# Patient Record
Sex: MALE | Race: BLACK OR AFRICAN AMERICAN | Employment: UNEMPLOYED | URBAN - METROPOLITAN AREA
[De-identification: names, ages, dates, MRNs, and addresses within clinical notes are randomized per-mention and may not be internally consistent; named-entity substitution may affect disease eponyms.]

---

## 2018-03-21 ENCOUNTER — OFFICE VISIT (OUTPATIENT)
Dept: CARDIOLOGY CLINIC | Age: 33
End: 2018-03-21

## 2018-03-21 VITALS
SYSTOLIC BLOOD PRESSURE: 200 MMHG | DIASTOLIC BLOOD PRESSURE: 120 MMHG | HEART RATE: 72 BPM | BODY MASS INDEX: 39.47 KG/M2 | HEIGHT: 73 IN | WEIGHT: 297.8 LBS

## 2018-03-21 DIAGNOSIS — R06.02 SOB (SHORTNESS OF BREATH): ICD-10-CM

## 2018-03-21 DIAGNOSIS — R94.31 ABNORMAL EKG: ICD-10-CM

## 2018-03-21 DIAGNOSIS — I10 MALIGNANT HYPERTENSION: ICD-10-CM

## 2018-03-21 PROCEDURE — 99204 OFFICE O/P NEW MOD 45 MIN: CPT | Performed by: INTERNAL MEDICINE

## 2018-03-21 RX ORDER — HYDROCHLOROTHIAZIDE 25 MG/1
25 TABLET ORAL DAILY
Qty: 30 TABLET | Refills: 5 | Status: SHIPPED | OUTPATIENT
Start: 2018-03-21

## 2018-03-21 RX ORDER — LISINOPRIL 40 MG/1
40 TABLET ORAL DAILY
Qty: 30 TABLET | Refills: 5 | Status: ON HOLD
Start: 2018-03-21 | End: 2018-06-01 | Stop reason: HOSPADM

## 2018-03-21 NOTE — LETTER
[address]  [address] [place] [address] [address] [address] [address]  Phone: [phone]  Fax: [phone]    Sandra Patel MD      March 21, 2018     Patient: Nj Pugh   YOB: 1985   Date of Visit: 3/21/2018     To Whom It May Concern: It is my medical opinion that Nj Pugh should stay off work until cardiac testing and evaluation has been completed. If you have any questions or concerns, please don't hesitate to call.     Sincerely,        Sandra Patel MD

## 2018-03-21 NOTE — PROGRESS NOTES
Normal breath sounds without dullness  Cardiovascular:  · The apical impulses not displaced  · Heart tones are crisp and normal  · Cervical veins are not engorged  · The carotid upstroke is normal in amplitude and contour without delay or bruit  · Peripheral pulses are symmetrical and full  · There is no clubbing, cyanosis of the extremities. · No edema  · Femoral Arteries: 2+ and equal  · Pedal Pulses: 2+ and equal   Abdomen:  · No masses or tenderness  · Liver/Spleen: No Abnormalities Noted  Neurological/Psychiatric:  · Alert and oriented in all spheres  · Moves all extremities well  · Exhibits normal gait balance and coordination  · No abnormalities of mood, affect, memory, mentation, or behavior are noted    Assessment/Plan:      Abnormal EKG from 3/18/18  EKG 3/18/18> Normal rhythm with hypertensive effects. Hypertension, malignant  Blood pressure (!) 200/120, pulse 72, height 6' 1\" (1.854 m), weight 297 lb 12.8 oz (135.1 kg). He took his Lisinopril 40mg this morning. He is also on prednisone. Add HCTZ 25mg daily in the morning along with the ACE. Creatinine 1.5 on 3/18/18 while in the ER. SOB  With inclines, some palps. Mr. Romeo Calles agrees to schedule a stress echo next week. I recommend he stay off work until testing completed. Letter given to patient. Add HCTZ 25mg to aid blood pressure. Return to office based on test results. I appreciate the opportunity of cooperating in the care of this individual.    Shemar Mcfadden.  Peter Bowling M.D., [facility]

## 2018-03-26 NOTE — COMMUNICATION BODY
tablet Take 2 tablets by mouth every 6 hours as needed for Pain 9/29/17   Arianna Soriano PA-C   lisinopril (PRINIVIL) 10 MG tablet Take 1 tablet by mouth daily  Patient taking differently: Take 40 mg by mouth daily  9/29/17 3/18/18  Arianna Soriano PA-C   albuterol (PROVENTIL HFA;VENTOLIN HFA) 108 (90 BASE) MCG/ACT inhaler Inhale 2 puffs into the lungs every 6 hours as needed. Historical Provider, MD      Allergies:  Ibuprofen     Review of Systems:   · Constitutional: there has been no unanticipated weight loss. There's been no change in energy level, sleep pattern, or activity level. · Eyes: No visual changes or diplopia. No scleral icterus. · ENT: No Headaches, hearing loss or vertigo. No mouth sores or sore throat. · Cardiovascular: Reviewed in HPI  · Respiratory: No cough or wheezing, no sputum production. No hematemesis. · Gastrointestinal: No abdominal pain, appetite loss, blood in stools. No change in bowel or bladder habits. · Genitourinary: No dysuria, trouble voiding, or hematuria. · Musculoskeletal:  No gait disturbance, weakness or joint complaints. · Integumentary: No rash or pruritis. · Neurological: No headache, diplopia, change in muscle strength, numbness or tingling. No change in gait, balance, coordination, mood, affect, memory, mentation, behavior. · Psychiatric: No anxiety, no depression. · Endocrine: No malaise, fatigue or temperature intolerance. No excessive thirst, fluid intake, or urination. No tremor. · Hematologic/Lymphatic: No abnormal bruising or bleeding, blood clots or swollen lymph nodes. · Allergic/Immunologic: No nasal congestion or hives.     Physical Examination:    Vitals:    03/21/18 1413   BP: (!) 200/120   Pulse:      Constitutional and General Appearance: Appears stated age, NAD  Skin:good turgor,intact without lesions  HEENT: EOMI ,normal  Neck:no JVD   Respiratory:  · Normal excursion and expansion without use of accessory muscles  · Resp Auscultation:

## 2018-03-27 ENCOUNTER — HOSPITAL ENCOUNTER (OUTPATIENT)
Dept: NON INVASIVE DIAGNOSTICS | Age: 33
Discharge: HOME | End: 2018-03-27
Attending: INTERNAL MEDICINE | Admitting: INTERNAL MEDICINE

## 2018-03-27 DIAGNOSIS — I10 ESSENTIAL (PRIMARY) HYPERTENSION: ICD-10-CM

## 2018-03-27 LAB
LEFT VENTRICULAR EJECTION FRACTION HIGH VALUE: 70 %
LEFT VENTRICULAR EJECTION FRACTION MODE: NORMAL

## 2018-05-29 PROBLEM — I10 ESSENTIAL HYPERTENSION: Status: ACTIVE | Noted: 2018-05-29

## 2018-05-29 PROBLEM — J96.01 ACUTE RESPIRATORY FAILURE WITH HYPOXIA (HCC): Status: ACTIVE | Noted: 2018-05-29

## 2018-05-29 PROBLEM — J45.901 ASTHMA EXACERBATION ATTACKS: Status: ACTIVE | Noted: 2018-05-29

## 2018-09-24 NOTE — PROGRESS NOTES
[facility]   Cardiac Follow up    Referring Provider:  Kristofer Viera     Chief Complaint   Patient presents with    6 Month Follow-Up     h/o \"abn. EKG\"    Hypertension    Shortness of Breath      History of Present Illness:  Ms. Romeo Calles is here today for regular follow up. I initially saw him March 2018 as a cardiac consultation request by Dr. Lv Denson for evaluation of an \"abnormal EKG\" which was done 3/18/18. He was rushed to the ER 3/18/18 after an apparent allergic reaction to ibuprofen; he received epi, steroids, nebulizers; he was sent home with oral prednisone. Initially referred for abnormal EKG that day. Today, he states he feels well overall. He denies exertional chest pain, NEWMAN/PND, palpitations, light-headedness, edema. He exercises at the gym, lifts weight, no problems with this activity. He does not smoke. Past Medical History:   has a past medical history of Asthma and HTN (hypertension). Surgical History:   has a past surgical history that includes Biceps tendon repair (Left). Social History:   reports that he has never smoked. He has never used smokeless tobacco. He reports that he drinks alcohol. He reports that he does not use drugs. Family History:  family history includes High Blood Pressure in his father. Home Medications:  Prior to Admission medications    Medication Sig Start Date End Date Taking? Authorizing Provider   loratadine-pseudoephedrine (CLARITIN-D 12 HOUR) 5-120 MG per extended release tablet Take 1 tablet by mouth 2 times daily 9/12/18   Michael Swanson PA-C   albuterol sulfate HFA (PROVENTIL HFA) 108 (90 Base) MCG/ACT inhaler Inhale 2 puffs into the lungs every 4 hours as needed for Wheezing or Shortness of Breath (Space out to every 6 hours as symptoms improve) Space out to every 6 hours as symptoms improve.  9/12/18   Michaellatasha Swanson PA-C   spironolactone (ALDACTONE) 25 MG tablet Take 1 tablet by mouth daily

## 2018-09-26 ENCOUNTER — OFFICE VISIT (OUTPATIENT)
Dept: CARDIOLOGY CLINIC | Age: 33
End: 2018-09-26
Payer: COMMERCIAL

## 2018-09-26 VITALS
WEIGHT: 292 LBS | DIASTOLIC BLOOD PRESSURE: 90 MMHG | SYSTOLIC BLOOD PRESSURE: 158 MMHG | HEIGHT: 73 IN | BODY MASS INDEX: 38.7 KG/M2 | HEART RATE: 76 BPM

## 2018-09-26 DIAGNOSIS — I10 ESSENTIAL HYPERTENSION: Primary | ICD-10-CM

## 2018-09-26 DIAGNOSIS — R06.02 SOB (SHORTNESS OF BREATH): ICD-10-CM

## 2018-09-26 DIAGNOSIS — R94.31 ABNORMAL EKG: ICD-10-CM

## 2018-09-26 DIAGNOSIS — R73.09 ELEVATED GLUCOSE: ICD-10-CM

## 2018-09-26 PROCEDURE — 99214 OFFICE O/P EST MOD 30 MIN: CPT | Performed by: INTERNAL MEDICINE

## 2018-09-26 RX ORDER — LISINOPRIL 20 MG/1
20 TABLET ORAL 2 TIMES DAILY
Qty: 60 TABLET | Refills: 11 | Status: SHIPPED | OUTPATIENT
Start: 2018-09-26

## 2018-09-26 RX ORDER — AMLODIPINE BESYLATE 5 MG/1
5 TABLET ORAL DAILY
Qty: 30 TABLET | Refills: 11 | Status: SHIPPED | OUTPATIENT
Start: 2018-09-26 | End: 2018-11-25

## 2018-09-26 RX ORDER — SPIRONOLACTONE 25 MG/1
25 TABLET ORAL DAILY
Qty: 30 TABLET | Refills: 11 | Status: CANCELLED | OUTPATIENT
Start: 2018-09-26

## 2018-09-26 NOTE — LETTER
[facility]  [address]  [address] [address] [address]  Phone: [phone]  Fax: [phone]    Kaiden Bishop MD        September 28, 2018     [facility] [address]    Patient: Michelle Walters  MR Number: [id number]  YOB: 1985  Date of Visit: 9/26/2018    Dear Dr. Dorothy Dancer:    Below are the relevant portions of my assessment and plan of care. [facility]   Cardiac Follow up    Referring Provider:  Dorothy Dancer     Chief Complaint   Patient presents with    6 Month Follow-Up     h/o \"abn. EKG\"    Hypertension    Shortness of Breath      History of Present Illness:  Ms. Mejia Westfall is here today for regular follow up. I initially saw him March 2018 as a cardiac consultation request by Dr. Garth Kamara for evaluation of an \"abnormal EKG\" which was done 3/18/18. He was rushed to the ER 3/18/18 after an apparent allergic reaction to ibuprofen; he received epi, steroids, nebulizers; he was sent home with oral prednisone. Initially referred for abnormal EKG that day. Today, he states he feels well overall. He denies exertional chest pain, NEWMAN/PND, palpitations, light-headedness, edema. He exercises at the gym, lifts weight, no problems with this activity. He does not smoke. Past Medical History:   has a past medical history of Asthma and HTN (hypertension). Surgical History:   has a past surgical history that includes Biceps tendon repair (Left). Social History:   reports that he has never smoked. He has never used smokeless tobacco. He reports that he drinks alcohol. He reports that he does not use drugs. Family History:  family history includes High Blood Pressure in his father. Home Medications:  Prior to Admission medications    Medication Sig Start Date End Date Taking?  Authorizing Provider   loratadine-pseudoephedrine (CLARITIN-D 12 HOUR) 5-120 MG per extended

## 2018-09-28 NOTE — COMMUNICATION BODY
depression. · Endocrine: No malaise, fatigue or temperature intolerance. No excessive thirst, fluid intake, or urination. No tremor. · Hematologic/Lymphatic: No abnormal bruising or bleeding, blood clots or swollen lymph nodes. · Allergic/Immunologic: No nasal congestion or hives. Physical Examination:    Blood pressure (!) 158/90, pulse 76, height 6' 1\" (1.854 m), weight 292 lb (132.5 kg). Constitutional and General Appearance: Appears stated age, NAD  Skin:good turgor,intact without lesions  HEENT: EOMI ,normal  Neck:no JVD   Respiratory:  · Normal excursion and expansion without use of accessory muscles  · Resp Auscultation: Normal breath sounds without dullness  Cardiovascular:  · The apical impulses not displaced  · Heart tones are crisp and normal  · Cervical veins are not engorged  · The carotid upstroke is normal in amplitude and contour without delay or bruit  · Peripheral pulses are symmetrical and full  · There is no clubbing, cyanosis of the extremities. · No edema  · Femoral Arteries: 2+ and equal  · Pedal Pulses: 2+ and equal   Abdomen:  · No masses or tenderness  · Liver/Spleen: No Abnormalities Noted  Neurological/Psychiatric:  · Alert and oriented in all spheres  · Moves all extremities well  · Exhibits normal gait balance and coordination  · No abnormalities of mood, affect, memory, mentation, or behavior are noted    Assessment:    Abnormal EKG from 3/18/18  EKG 3/18/18> Normal rhythm with hypertensive effects. Hypertension, malignant (improved)  Elevated today. 170/86 on recheck by me. Blood pressure (!) 158/90, pulse 76, height 6' 1\" (1.854 m), weight 292 lb (132.5 kg). Creatinine 1.3 / K+ 3.7 on 6/1/18 (creat. Improved). Glucose 272 5/31/18, then 119 on 6/1/18. Stop spironolactone. Begin amlodipine 5mg qd. Remains on Lisinopril 20mg bid & HCTZ 25mg qd. ECHO 6/1/18> Normal with EF 55%, trivial TR. SOB, chronic  With inclines, some palps.         Plan:  Mr. Ghotra has a stable cardiac status. 1. Stop Aldactone. Begin amlodipine 5mg. BMP / A1c 2 weeks. 2. Will continue with risk factor modifications. 3. Return for regular follow up in 4 months. 4. Has deepika't with PCP coming up soon. I appreciate the opportunity of cooperating in the care of this individual.    Maite Goyal. Jd Zelaya M.D., [facility]    This note was scribed in the presence of Dr. Jd Zelaya MD, by Sukh Patel RN. The scribe's documentation has been prepared under my direction and personally reviewed by me in its entirety. I confirm that the note above accurately reflects all work, treatment, procedures, and medical decision making performed by me.

## 2018-11-25 ENCOUNTER — APPOINTMENT (OUTPATIENT)
Dept: CT IMAGING | Age: 33
End: 2018-11-25
Payer: COMMERCIAL

## 2018-11-25 ENCOUNTER — HOSPITAL ENCOUNTER (EMERGENCY)
Age: 33
Discharge: HOME | End: 2018-11-25
Payer: COMMERCIAL

## 2018-11-25 VITALS
SYSTOLIC BLOOD PRESSURE: 180 MMHG | RESPIRATION RATE: 18 BRPM | TEMPERATURE: 98.1 F | DIASTOLIC BLOOD PRESSURE: 89 MMHG | HEIGHT: 73 IN | BODY MASS INDEX: 38.7 KG/M2 | OXYGEN SATURATION: 100 % | HEART RATE: 89 BPM | WEIGHT: 292 LBS

## 2018-11-25 DIAGNOSIS — S01.81XA FACIAL LACERATION, INITIAL ENCOUNTER: Primary | ICD-10-CM

## 2018-11-25 PROCEDURE — 6360000002 HC RX W HCPCS: Performed by: PHYSICIAN ASSISTANT

## 2018-11-25 PROCEDURE — 90471 IMMUNIZATION ADMIN: CPT | Performed by: PHYSICIAN ASSISTANT

## 2018-11-25 PROCEDURE — 96372 THER/PROPH/DIAG INJ SC/IM: CPT

## 2018-11-25 PROCEDURE — 4500000023 HC ED LEVEL 3 PROCEDURE

## 2018-11-25 PROCEDURE — 70486 CT MAXILLOFACIAL W/O DYE: CPT

## 2018-11-25 PROCEDURE — 90715 TDAP VACCINE 7 YRS/> IM: CPT | Performed by: PHYSICIAN ASSISTANT

## 2018-11-25 PROCEDURE — 99283 EMERGENCY DEPT VISIT LOW MDM: CPT

## 2018-11-25 PROCEDURE — 6370000000 HC RX 637 (ALT 250 FOR IP): Performed by: PHYSICIAN ASSISTANT

## 2018-11-25 RX ORDER — LISINOPRIL 10 MG/1
20 TABLET ORAL ONCE
Status: COMPLETED | OUTPATIENT
Start: 2018-11-25 | End: 2018-11-25

## 2018-11-25 RX ORDER — HYDROCHLOROTHIAZIDE 25 MG/1
25 TABLET ORAL ONCE
Status: COMPLETED | OUTPATIENT
Start: 2018-11-25 | End: 2018-11-25

## 2018-11-25 RX ORDER — ACETAMINOPHEN 500 MG
500 TABLET ORAL EVERY 6 HOURS PRN
Qty: 20 TABLET | Refills: 0 | Status: SHIPPED | OUTPATIENT
Start: 2018-11-25

## 2018-11-25 RX ADMIN — HYDROCHLOROTHIAZIDE 25 MG: 25 TABLET ORAL at 15:59

## 2018-11-25 RX ADMIN — TETANUS TOXOID, REDUCED DIPHTHERIA TOXOID AND ACELLULAR PERTUSSIS VACCINE, ADSORBED 0.5 ML: 5; 2.5; 8; 8; 2.5 SUSPENSION INTRAMUSCULAR at 15:59

## 2018-11-25 RX ADMIN — LISINOPRIL 20 MG: 10 TABLET ORAL at 15:59

## 2018-11-25 RX ADMIN — Medication 3 ML: at 15:59

## 2018-11-25 ASSESSMENT — PAIN SCALES - GENERAL: PAINLEVEL_OUTOF10: 5

## 2018-11-25 NOTE — ED PROVIDER NOTES
DISKUS) 250-50 MCG/DOSE AEPB Inhale 1 puff into the lungs every 12 hours Unsure of dose      hydrochlorothiazide (HYDRODIURIL) 25 MG tablet Take 1 tablet by mouth daily 30 tablet 5    pantoprazole (PROTONIX) 40 MG tablet Take 1 tablet by mouth every morning (before breakfast) 30 tablet 0    EPINEPHrine (EPIPEN 2-FLORECITA) 0.3 MG/0.3ML SOAJ injection Inject 0.3 mLs into the muscle once as needed (allergic reaction) Use as directed for allergic reaction 1 each 0       ALLERGIES    Allergies   Allergen Reactions    Ibuprofen Anaphylaxis       FAMILY HISTORY/SOCIAL HISTORY    Family History   Problem Relation Age of Onset    High Blood Pressure Father        Social History     Social History    Marital status: Single     Spouse name: N/A    Number of children: N/A    Years of education: N/A     Social History Main Topics    Smoking status: Never Smoker    Smokeless tobacco: Never Used    Alcohol use Yes      Comment: occassionally    Drug use: No    Sexual activity: Not Asked     Other Topics Concern    None     Social History Narrative    None       PHYSICAL EXAM    VITAL SIGNS: BP (!) 185/119   Pulse 89   Temp 98.1 °F (36.7 °C) (Infrared)   Resp 18   Ht 6' 1\" (1.854 m)   Wt 292 lb (132.5 kg)   SpO2 100%   BMI 38.52 kg/m²   Constitutional:  Well developed, well-nourished, no distress due to pain  Head: Normocephalic, mild swelling and tenderness to palpation to the left temple region and zygomatic arch, no crepitus  Vascular: radial pulses 2+ bilaterally, capillary refill <3 sec  NEUROLOGIC: Motor, strength, and sensory distal to the wound is intact and normal  Musculoskeletal: No obvious deformities of neck or back, no pain in the neck or back.  Full ROM of neck and back  Integument:  2cm \"Y\" shaped laceration to the right side of the upper lip, barely crossing vermilion border, 2cm healing vertical laceration to the middle of the upper lip, 0.5cm laceration to the left side of the upper mouth just

## 2020-11-03 PROBLEM — I10 ESSENTIAL HYPERTENSION: Status: RESOLVED | Noted: 2018-05-29 | Resolved: 2020-11-03

## 2022-11-27 ENCOUNTER — APPOINTMENT (OUTPATIENT)
Dept: GENERAL RADIOLOGY | Age: 37
End: 2022-11-27

## 2022-11-27 ENCOUNTER — HOSPITAL ENCOUNTER (EMERGENCY)
Age: 37
Discharge: HOME | End: 2022-11-27
Attending: EMERGENCY MEDICINE

## 2022-11-27 VITALS
OXYGEN SATURATION: 97 % | HEART RATE: 78 BPM | TEMPERATURE: 98.7 F | HEIGHT: 74 IN | BODY MASS INDEX: 38.34 KG/M2 | WEIGHT: 298.72 LBS | RESPIRATION RATE: 16 BRPM | SYSTOLIC BLOOD PRESSURE: 160 MMHG | DIASTOLIC BLOOD PRESSURE: 106 MMHG

## 2022-11-27 DIAGNOSIS — J10.1 INFLUENZA A: Primary | ICD-10-CM

## 2022-11-27 LAB
RAPID INFLUENZA  B AGN: NEGATIVE
RAPID INFLUENZA A AGN: POSITIVE

## 2022-11-27 PROCEDURE — 87804 INFLUENZA ASSAY W/OPTIC: CPT

## 2022-11-27 PROCEDURE — 6370000000 HC RX 637 (ALT 250 FOR IP): Performed by: EMERGENCY MEDICINE

## 2022-11-27 PROCEDURE — 99284 EMERGENCY DEPT VISIT MOD MDM: CPT

## 2022-11-27 PROCEDURE — 6360000002 HC RX W HCPCS: Performed by: EMERGENCY MEDICINE

## 2022-11-27 PROCEDURE — 71046 X-RAY EXAM CHEST 2 VIEWS: CPT

## 2022-11-27 RX ORDER — PREDNISONE 20 MG/1
60 TABLET ORAL ONCE
Status: COMPLETED | OUTPATIENT
Start: 2022-11-27 | End: 2022-11-27

## 2022-11-27 RX ORDER — ALBUTEROL SULFATE 90 UG/1
2 AEROSOL, METERED RESPIRATORY (INHALATION) EVERY 4 HOURS PRN
Qty: 1 EACH | Refills: 0 | Status: SHIPPED | OUTPATIENT
Start: 2022-11-27

## 2022-11-27 RX ORDER — PREDNISONE 50 MG/1
50 TABLET ORAL DAILY
Qty: 5 TABLET | Refills: 0 | Status: SHIPPED | OUTPATIENT
Start: 2022-11-27 | End: 2022-12-02

## 2022-11-27 RX ORDER — ALBUTEROL SULFATE 2.5 MG/3ML
5 SOLUTION RESPIRATORY (INHALATION) ONCE
Status: COMPLETED | OUTPATIENT
Start: 2022-11-27 | End: 2022-11-27

## 2022-11-27 RX ADMIN — PREDNISONE 60 MG: 20 TABLET ORAL at 09:46

## 2022-11-27 RX ADMIN — ALBUTEROL SULFATE 5 MG: 2.5 SOLUTION RESPIRATORY (INHALATION) at 09:46

## 2022-11-27 ASSESSMENT — ENCOUNTER SYMPTOMS
ABDOMINAL PAIN: 0
SHORTNESS OF BREATH: 1
COUGH: 1
SORE THROAT: 0
ABDOMINAL DISTENTION: 0
RHINORRHEA: 1

## 2022-11-27 ASSESSMENT — PAIN - FUNCTIONAL ASSESSMENT: PAIN_FUNCTIONAL_ASSESSMENT: 0-10

## 2022-11-27 ASSESSMENT — PAIN SCALES - GENERAL: PAINLEVEL_OUTOF10: 4

## 2022-11-27 ASSESSMENT — PAIN DESCRIPTION - LOCATION: LOCATION: HEAD

## 2022-11-27 NOTE — ED NOTES
AVS reviewed with patient. Verbalized understanding. AVS was printed and given to patient. Prescriptions sent electronically to patients pharmacy of choice.      Vira Garcia (Marisa) Maria Del Carmen Cortés RN  11/27/22 3781

## 2022-11-27 NOTE — ED PROVIDER NOTES
[facility] ENCOUNTER      Pt Name: Damien Chavez  MRN: [id number]  [place] 1985  Date of evaluation: 11/27/2022  Provider: Fernando Solo MD    [address]       Chief Complaint   Patient presents with    Cough     C/o cough, sneezing, runny nose, chills, body aches,  and headache         HISTORY OF PRESENT ILLNESS   (Location/Symptom, Timing/Onset, Context/Setting, Quality, Duration, Modifying Factors, Severity)  Note limiting factors. Damien Chavez is a 40 y.o. male who presents to the emergency department Complaining of cough fevers chills runny nose and congestion for 4 days. Patient states he is getting better from the cough and congestion but now has developed shortness of breath. Patient has a history of asthma and states is just fired up his asthma. Patient states it hurts in his chest and both anterior and posteriorly when he coughs but he has no other chest pain when he is not coughing. Patient denies any nausea or vomiting. Patient denies any diarrhea or abdominal pain. Patient denies any earaches or other complaints or problems. He is here with his wife who also had been sick and their son had also been sick with flulike symptoms      Nursing Notes were reviewed. REVIEW OF SYSTEMS    (2-9 systems for level 4, 10 or more for level 5)     Review of Systems   Constitutional:  Positive for chills and fever. HENT:  Positive for congestion, rhinorrhea and sneezing. Negative for sore throat. Respiratory:  Positive for cough and shortness of breath. Cardiovascular:  Negative for chest pain. Gastrointestinal:  Negative for abdominal distention and abdominal pain. Genitourinary:  Negative for difficulty urinating and dysuria. Neurological:  Negative for dizziness, weakness and numbness. Hematological:  Negative for adenopathy. Psychiatric/Behavioral:  Negative for agitation and behavioral problems. Except as noted above the remainder of the review of systems was reviewed and negative.        PAST MEDICAL HISTORY     Past Medical History:   Diagnosis Date    Asthma     HTN (hypertension)          SURGICAL HISTORY       Past Surgical History:   Procedure Laterality Date    BICEPS TENDON REPAIR Left     2013         CURRENT MEDICATIONS       Previous Medications    ACETAMINOPHEN (APAP EXTRA STRENGTH) 500 MG TABLET    Take 1 tablet by mouth every 6 hours as needed for Pain    EPINEPHRINE (EPIPEN 2-FLORECITA) 0.3 MG/0.3ML SOAJ INJECTION    Inject 0.3 mLs into the muscle once as needed (allergic reaction) Use as directed for allergic reaction    HYDROCHLOROTHIAZIDE (HYDRODIURIL) 25 MG TABLET    Take 1 tablet by mouth daily    IPRATROPIUM-ALBUTEROL (DUONEB) 0.5-2.5 (3) MG/3ML SOLN NEBULIZER SOLUTION    Inhale 1 vial into the lungs every 4 hours    LISINOPRIL (PRINIVIL;ZESTRIL) 20 MG TABLET    Take 1 tablet by mouth 2 times daily    LORATADINE-PSEUDOEPHEDRINE (CLARITIN-D 12 HOUR) 5-120 MG PER EXTENDED RELEASE TABLET    Take 1 tablet by mouth 2 times daily    PANTOPRAZOLE (PROTONIX) 40 MG TABLET    Take 1 tablet by mouth every morning (before breakfast)       ALLERGIES     Ibuprofen    FAMILY HISTORY       Family History   Problem Relation Age of Onset    High Blood Pressure Father           SOCIAL HISTORY       Social History     Socioeconomic History    Marital status: Single   Tobacco Use    Smoking status: Never    Smokeless tobacco: Never   Substance and Sexual Activity    Alcohol use: Yes     Comment: occassionally    Drug use: No       SCREENINGS         Willow Coma Scale  Eye Opening: Spontaneous  Best Verbal Response: Oriented  Best Motor Response: Obeys commands  Cheikh Coma Scale Score: 15                     CIWA Assessment  BP: (!) 160/106  Heart Rate: 78                 PHYSICAL EXAM    (up to 7 for level 4, 8 or more for level 5)     ED Triage Vitals [11/27/22 0921]   BP Temp Temp Source Heart Rate Resp SpO2 Height Weight   (!) 160/106 98.7 °F (37.1 °C) Oral 78 16 97 % 6' 2\" (1.88 m) 298 lb 11.6 oz (135.5 kg)       Physical Exam  Constitutional:       Appearance: Normal appearance. HENT:      Head: Normocephalic and atraumatic. Right Ear: Tympanic membrane normal.      Left Ear: Tympanic membrane normal.      Nose: Nose normal.      Mouth/Throat:      Mouth: Mucous membranes are moist.      Pharynx: Oropharynx is clear. Eyes:      Extraocular Movements: Extraocular movements intact. Conjunctiva/sclera: Conjunctivae normal.      Pupils: Pupils are equal, round, and reactive to light. Cardiovascular:      Rate and Rhythm: Normal rate and regular rhythm. Pulmonary:      Effort: Pulmonary effort is normal.      Breath sounds: No stridor. Wheezing present. No rhonchi or rales. Chest:      Chest wall: Tenderness present. Abdominal:      General: Abdomen is flat. Bowel sounds are normal.      Palpations: Abdomen is soft. Musculoskeletal:         General: Normal range of motion. Skin:     General: Skin is warm and dry. Capillary Refill: Capillary refill takes less than 2 seconds. Neurological:      General: No focal deficit present. Mental Status: He is alert and oriented to person, place, and time. Cranial Nerves: No cranial nerve deficit. Sensory: No sensory deficit. Motor: No weakness. Coordination: Coordination normal.      Gait: Gait normal.      Deep Tendon Reflexes: Reflexes normal.   Psychiatric:         Mood and Affect: Mood normal.         Behavior: Behavior normal.       DIAGNOSTIC RESULTS     RADIOLOGY:   Non-plain film images such as CT, Ultrasound and MRI are read by the radiologist. Plain radiographic images are visualized and preliminarily interpreted by the emergency physician with the below findings:        Interpretation per the Radiologist below, if available at the time of this note:    XR CHEST (2 VW)   Final Result   1. No acute abnormality. ED BEDSIDE ULTRASOUND:   Performed by ED Physician - none    LABS:  Labs Reviewed   RAPID INFLUENZA A/B ANTIGENS - Abnormal; Notable for the following components:       Result Value    Rapid Influenza A Ag POSITIVE (*)     All other components within normal limits       All other labs were within normal range or not returned as of this dictation. EMERGENCY DEPARTMENT COURSE and DIFFERENTIAL DIAGNOSIS/MDM:   Vitals:    Vitals:    11/27/22 0921   BP: (!) 160/106   Pulse: 78   Resp: 16   Temp: 98.7 °F (37.1 °C)   TempSrc: Oral   SpO2: 97%   Weight: 298 lb 11.6 oz (135.5 kg)   Height: 6' 2\" (1.88 m)         Is this patient to be included in the SEP-1 Core Measure due to severe sepsis or septic shock? No   Exclusion criteria - the patient is NOT to be included for SEP-1 Core Measure due to:  Viral etiology found or highly suspected (including COVID-19) without concomitant bacterial infection     MDM  Number of Diagnoses or Management Options  Influenza A  Diagnosis management comments: Presented with symptoms consistent with influenza and he ultimately tested positive for influenza A. I did a chest x-ray because he is an asthmatic and was wheezing. The patient's chest x-ray showed no acute disease. This was read by radiology and I also reviewed it. The patient denies any other symptoms or complaints at all think needs any further testing. I do not think he requires admission. On place him off work for 2 days. Yonis King place him on a 5-day burst of steroids and refill his inhalers. I do not think antibiotics are indicated. After 1 breathing treatment the patient was completely wheeze free. The patient's blood pressure was elevated on presentation. He had not taken his blood pressure medicine this morning.   I advised him the importance of taking his blood pressure medicine every day and also counseled him on having his blood pressure checked every couple of days just randomly and then follow-up with his primary care doctor about his blood pressure          FINAL IMPRESSION      1. Influenza A          DISPOSITION/PLAN   DISPOSITION Decision To Discharge 11/27/2022 10:06:32 AM      PATIENT REFERRED TO:  [facility]  [address]  [facility] [address]  [phone]          DISCHARGE MEDICATIONS:  New Prescriptions    PREDNISONE (DELTASONE) 50 MG TABLET    Take 1 tablet by mouth daily for 5 days     Controlled Substances Monitoring:     No flowsheet data found. (Please note that portions of this note were completed with a voice recognition program.  Efforts were made to edit the dictations but occasionally words are mis-transcribed. )    Tino Henrdix MD (electronically signed)  Attending Emergency Physician            Yan Saeed MD  11/27/22 [address], [place]  11/27/22 Angela Chaney MD  11/27/22 1010

## 2022-11-27 NOTE — DISCHARGE INSTRUCTIONS
Plenty fluids and rest.  No work today or tomorrow. Follow-up with your primary care doctor not complete better in 3 to 5 days sooner if worse or problems. Return immediately if any concerns or worsening symptoms. Your blood pressure was elevated today. Make sure you follow-up your primary care doctor because you may need additional medications.   I know you did not take your blood pressure medicine today but make sure you start taking it regularly and your blood pressure should always be below 140/90

## 2022-11-27 NOTE — Clinical Note
Tonia Felix was seen and treated in our emergency department on 11/27/2022. He may return to work on 11/29/2022. If you have any questions or concerns, please don't hesitate to call.       Mary Stroud MD

## 2023-07-28 ENCOUNTER — HOSPITAL ENCOUNTER (EMERGENCY)
Age: 38
Discharge: HOME | End: 2023-07-28
Attending: EMERGENCY MEDICINE

## 2023-07-28 ENCOUNTER — APPOINTMENT (OUTPATIENT)
Dept: GENERAL RADIOLOGY | Age: 38
End: 2023-07-28

## 2023-07-28 VITALS
DIASTOLIC BLOOD PRESSURE: 94 MMHG | BODY MASS INDEX: 38.82 KG/M2 | OXYGEN SATURATION: 96 % | WEIGHT: 302.5 LBS | SYSTOLIC BLOOD PRESSURE: 144 MMHG | HEART RATE: 71 BPM | TEMPERATURE: 98.1 F | RESPIRATION RATE: 17 BRPM | HEIGHT: 74 IN

## 2023-07-28 DIAGNOSIS — M25.472 EFFUSION OF LEFT ANKLE: ICD-10-CM

## 2023-07-28 DIAGNOSIS — S93.402A SPRAIN OF LEFT ANKLE, UNSPECIFIED LIGAMENT, INITIAL ENCOUNTER: Primary | ICD-10-CM

## 2023-07-28 PROCEDURE — 6370000000 HC RX 637 (ALT 250 FOR IP): Performed by: EMERGENCY MEDICINE

## 2023-07-28 PROCEDURE — 99283 EMERGENCY DEPT VISIT LOW MDM: CPT

## 2023-07-28 PROCEDURE — 73610 X-RAY EXAM OF ANKLE: CPT

## 2023-07-28 RX ORDER — ACETAMINOPHEN 500 MG
1000 TABLET ORAL ONCE
Status: COMPLETED | OUTPATIENT
Start: 2023-07-28 | End: 2023-07-28

## 2023-07-28 RX ADMIN — ACETAMINOPHEN 1000 MG: 500 TABLET ORAL at 21:01

## 2023-07-28 ASSESSMENT — PAIN SCALES - GENERAL: PAINLEVEL_OUTOF10: 8

## 2023-07-29 NOTE — ED PROVIDER NOTES
Emergency Physician Note  [address]    Pt Name: Alycia Tavarez  MRN: [id number]  [facility] 1985  Date of evaluation: 7/28/2023  Provider: Samir Castro MD  PCP: Dillon Portillo Open Time: 10:08 PM EDT 7/28/23    Chief Complaint  Ankle Pain (L ankle, states he stepped into a hole and twisted his ankle )       History of Present Illness  Alycia Tavarez is a 41 y.o. male who presents to the ED for ankle pain. Patient reports he stepped in a hole and sprained his ankle. This occurred couple days ago. He is continued to have pain which is part of the reason for presentation. He also believes this will interfere with his job and would like a note for work. The pain is moderate and constant and worsened by range of motion or weightbearing. History from : Patient    Limitations to history : None    REVIEW OF SYSTEMS :      Review of Systems    Positives and Pertinent negatives as per HPI. Medications/allergies/medical/social/family history  I have reviewed the following from the nursing documentation:      Prior to Admission medications    Medication Sig Start Date End Date Taking? Authorizing Provider   albuterol sulfate HFA (PROVENTIL HFA) 108 (90 Base) MCG/ACT inhaler Inhale 2 puffs into the lungs every 4 hours as needed for Wheezing or Shortness of Breath (Space out to every 6 hours as symptoms improve) Space out to every 6 hours as symptoms improve. 11/27/22   Lawyer Kathleen SHANE MD   fluticasone-salmeterol (ADVAIR) 250-50 MCG/DOSE AEPB Inhale 1 puff into the lungs in the morning and 1 puff in the evening. Unsure of dose .  11/27/22   Lawyer Kathleen SHANE MD   acetaminophen (APAP EXTRA STRENGTH) 500 MG tablet Take 1 tablet by mouth every 6 hours as needed for Pain 11/25/18   Renan Craven PA-C   lisinopril (PRINIVIL;ZESTRIL) 20 MG tablet Take 1 tablet by mouth 2 times daily 9/26/18   Smita Sykes MD   loratadine-pseudoephedrine (CLARITIN-D 12

## 2023-07-29 NOTE — ED TRIAGE NOTES
Patient presents to ED with L ankle pain, states he stepped into a hole while walking and twisted his ankle. Denies hearing any popping or snapping. Rates pain 8/10. Vitals WNL, airway is patent, he is alert and oriented x4.

## 2023-07-29 NOTE — ED NOTES
Patient DCed from ED at this time. Discussed AVS, follow up, and scripts. They verbalized understanding. Reinforced that should symptoms persist or worsen to return to the ED. They verbalized understanding. Patient ambulated out of ED. RN thanked patient for choosing [facility]).         Missy Ayers RN  07/28/23 [id number]